# Patient Record
(demographics unavailable — no encounter records)

---

## 2024-11-02 NOTE — PROCEDURE
[Complete Heart Block] : complete heart block [Pacemaker] : pacemaker [DDD] : DDD [Threshold Testing Performed] : Threshold testing was performed [de-identified] : [facility] [de-identified] : [employer]urity [de-identified] : 2022 [de-identified] : 7  years [de-identified] : 60-13 [de-identified] : AP 34%  100% no events  see paceart 7/11/24

## 2024-11-02 NOTE — HISTORY OF PRESENT ILLNESS
[FreeTextEntry1] : 72 year old male with CAD s/p stents, HLD, HTN and complete heart block s/p pacemaker s/p generator change 12/2022, who presents for follow up.  He is here with his son / HCP and does not want a phone  - his son translated as per his request.  He presents for routine follow up and overall is doing well.  No device related complaints.  No palpitations, chest pain, syncope or edema.   He thinks he had an echo with his cardiologist in the last year and states he was told it was normal.  -He does not have a list of his medications again.

## 2024-11-02 NOTE — REVIEW OF SYSTEMS
[Negative] : Heme/Lymph [Fever] : no fever [Weight Gain (___ Lbs)] : no recent weight gain [Chills] : no chills [Weight Loss (___ Lbs)] : no recent weight loss [SOB] : no shortness of breath [Dyspnea on exertion] : not dyspnea during exertion [Chest Discomfort] : no chest discomfort [Palpitations] : no palpitations [Orthopnea] : no orthopnea [Syncope] : no syncope

## 2024-11-02 NOTE — PROCEDURE
[Complete Heart Block] : complete heart block [Pacemaker] : pacemaker [DDD] : DDD [Threshold Testing Performed] : Threshold testing was performed [de-identified] : [facility] [de-identified] : Assurity [de-identified] : 2022 [de-identified] : 60-23 [de-identified] : 7  years [de-identified] : AP 34%  100% no events  see paceart 7/11/24

## 2024-11-02 NOTE — PHYSICAL EXAM
[Clean] : clean [Dry] : dry [Well-Healed] : well-healed [Serosanguineous Drainage] : no serosanquineous drainage [Well Developed] : well developed [Well Nourished] : well nourished [No Acute Distress] : no acute distress [Normal Conjunctiva] : normal conjunctiva [Normal Venous Pressure] : normal venous pressure [5th Left ICS - MCL] : palpated at the 5th LICS in the midclavicular line [Normal Rate] : normal [Rhythm Regular] : regular [Normal S1] : normal S1 [Normal S2] : normal S2 [Clear Lung Fields] : clear lung fields [Good Air Entry] : good air entry [No Respiratory Distress] : no respiratory distress  [Normal Gait] : normal gait [No Edema] : no edema [No Rash] : no rash [Moves all extremities] : moves all extremities [Alert and Oriented] : alert and oriented [Palpable Crepitus] : no palpable crepitus [Bleeding] : no active bleeding [Foul Odor] : no foul smell [Purulent Drainage] : no purulent drainage [Serous Drainage] : no serous drainage [Erythema] : not erythematous [Warm] : not warm [Tender] : not tender [Indurated] : not indurated [Fluctuant] : not fluctuant

## 2024-11-02 NOTE — ADDENDUM
[FreeTextEntry1] : I, Jose F Trejo, hereby attest that the medical record entry for this patient accurately reflects signatures/notations that I made on the Date of Service in my capacity as an Attending Physician when I treated/diagnosed the above patient. I do hereby attest that this information is true, accurate and complete to the best of my knowledge and I understand that any falsification, omission, or concealment of material fact may subject me to administrative, civil, or, criminal liability. I agree with the note as written by my PA in its entirety.\par  I was present for the entire visit and supervised the entire visit and agree with the plan as outlined.\par  \par  \par  I, Yonathan Moralez, am scribing for and the presence of Dr. Trejo the following sections: HPI, PMH,Family/social history, ROS, Physical Exam, Assessment / Plan.\par

## 2024-11-02 NOTE — DISCUSSION/SUMMARY
[Pacemaker Function Normal] : normal pacemaker function [FreeTextEntry1] : 72 year old male with CAD s/p stents, HLD, HTN and complete heart block s/p pacemaker s/p generator change 12/2022, who presents for follow up.  Device interrogation reveals normal function and all measured data is within normal limits.  No events for review and no changes made today.  Rollow up in 6 months or sooner if needed.  He knows to call with any questions or concerns.

## 2024-11-02 NOTE — PROCEDURE
[Complete Heart Block] : complete heart block [Pacemaker] : pacemaker [DDD] : DDD [Threshold Testing Performed] : Threshold testing was performed [de-identified] : [facility] [de-identified] : [employer]urity [de-identified] : 2022 [de-identified] : 7  years [de-identified] : 60-67 [de-identified] : AP 34%  100% no events  see paceart 7/11/24